# Patient Record
(demographics unavailable — no encounter records)

---

## 2018-01-01 NOTE — DIS-2
DATE OF DELIVERY:  2018

 

DATE OF ADMISSION:  2018

 

DATE OF DISCHARGE:  2018

 

ATTENDING:  Dr. Homa Turner.

 

RESIDENT:  Edie Rodrigues MD

 

DISCHARGE DIAGNOSES:

1.  Term appropriate for gestational age viable male.

2.  Maternal history of anemia in pregnancy.

 

PROCEDURES:  None.

 

HISTORY OF PRESENT ILLNESS:  Baby boy represented the 39-week 6-day product delivered of a 24-year-ol
d G3, P2-0-0-2.  Blood type AB positive, chlamydia negative, gonorrhea negative, GBS negative, hepati
tis B negative, HIV negative, RPR negative, rubella immune.  Maternal history is positive for anemia 
and pregnancy.  Pregnancy was overall uncomplicated.

 

Normal spontaneous vaginal delivery was accomplished at 11:19 on 2018  by Dr. Edie Rodrigues with 
Dr. Homa Turner, attending.  No resuscitation was needed.  Apgars were 9 and 9 at one and five minute
s respectively.

 

PHYSICAL EXAMINATION:  Weight 3500 grams, length 20 inches, head circumference 14 inches.  The physic
al exam was remarkable for some facial bruising and sacral [place] spots.  The facial bruising was 
resolved by the day of discharge.

 

HOSPITAL COURSE:  The infant experienced an unremarkable hospital course, established feedings well, 
voided and stooled normally.

 

DISPOSITION:

1.  Discharged to home on 2018 with a discharge weight of 3375 grams.

2.  Medications:  None.

3.  Diet:  Bottle feeding.

4.  Hearing screen passed on 2018.

5.  Hepatitis B vaccine given on 2018.

6.  Discharge bilirubin was 6.7 at 26 hours of life, placing the patient in the high intermediate ris
k category.

7.  Follow up with Dr. Homa Quintana at [facility] in 2 days and follow up with repeat bili
nolan on 2018.